# Patient Record
Sex: MALE | Race: WHITE | NOT HISPANIC OR LATINO | Employment: OTHER | RURAL
[De-identification: names, ages, dates, MRNs, and addresses within clinical notes are randomized per-mention and may not be internally consistent; named-entity substitution may affect disease eponyms.]

---

## 2020-05-19 ENCOUNTER — OFFICE VISIT CONVERTED (OUTPATIENT)
Dept: CARDIOLOGY | Facility: CLINIC | Age: 80
End: 2020-05-19
Attending: INTERNAL MEDICINE

## 2021-05-10 NOTE — H&P
History and Physical      Patient Name: Barbara Laird   Patient ID: [id number]   Sex: Male   YOB: 1940    Primary Care Provider: No PCP No PCP Other    Visit Date: May 19, 2020    Provider: Cedric Herzog MD   Location: [facility]   Location Address: [address] [place] [address]  [id number]          History Of Present Illness  Consult requested by: Lillian Leon   I saw Mr. Laird in the office today. This is an 79-year-old, white male. He has no previous cardiac history. Twice over the past month he has had an episode of palpitations moderately intense feeling like his heart is pounding. It has occurred both times, by his report, after he took his wife's nebulizer treatment. It resolved. He denies chest pain. He has mild shortness of breath with exertion. He stays relatively active. The symptoms had no clear exacerbating or alleviating factors, and there were no associated symptoms. Symptoms lasted approximately 1 hour.   PAST MEDICAL HISTORY: includes overweight, prostate problems, hypertension, dyslipidemia, diabetes.   PSYCHOSOCIAL HISTORY: Former smoker. No alcohol use. Rare caffeine intake. He is .   FAMILY HISTORY: Negative for premature coronary artery disease or sudden cardiac death.   CURRENT MEDICATIONS: include Lisinopril 10 mg a day; Flomax 0.4 mg a day; Crestor; Lantus 30 units qhs; Humulin. The dosage and frequency of the medications were reviewed with the patient.   ALLERGIES: No known drug allergies.      PAST SURGICAL HISTORY:  He has had back surgery in 1986 and 1993.       Review of Systems  · Constitutional  o Admits  o : good general health lately  o Denies  o : fatigue, recent weight changes   · Eyes  o Denies  o : double vision  · HENT  o Denies  o : hearing loss or ringing, chronic sinus problem, swollen glands in neck  · Cardiovascular  o Admits  o : palpitations (fast, fluttering, or skipping beats), shortness of breath while walking  "or lying flat  o Denies  o : chest pain, swelling (feet, ankles, hands)  · Respiratory  o Admits  o : a cough  o Denies  o : asthma or wheezing, COPD  · Gastrointestinal  o Denies  o : ulcers, nausea or vomiting  · Neurologic  o Denies  o : lightheaded or dizzy, stroke, headaches  · Musculoskeletal  o Denies  o : joint pain, back pain  · Endocrine  o Admits  o : diabetes  o Denies  o : thyroid disease, heat or cold intolerance, excessive thirst or urination  · Heme-Lymph  o Denies  o : bleeding or bruising tendency, anemia      Vitals  Date Time BP Position Site L\R Cuff Size HR RR TEMP (F) WT  HT  BMI kg/m2 BSA m2 O2 Sat HC       05/19/2020 03:38 /86 Sitting    66 - R   233lbs 16oz 5'  10\" 33.58 2.29     05/19/2020 03:38 /79 Sitting                     Physical Examination  · Constitutional  o Appearance  o : Obese, elderly, white male, pleasant, in no acute distress.  · Head and Face  o HEENT  o : No pallor, anicteric. Eyes normal. Moist mucous membranes.  · Neck  o Inspection/Palpation  o : Supple. No hepatosplenomegaly.  o Jugular Veins  o : No JVD. No carotid bruits.  · Respiratory  o Auscultation of Lungs  o : Clear to auscultation bilaterally. No crackles or wheezing.  · Cardiovascular  o Heart  o : S1, S2 is normally heard. No S3. No murmur, rubs, or gallops.  · Gastrointestinal  o Abdominal Examination  o : Soft, non-distended. No palpable hepatosplenomegaly. Bowel sounds heard in all four quadrants.  · Musculoskeletal  o General  o : Normal muscle tone and strength.  · Skin and Subcutaneous Tissue  o General Inspection  o : No skin rashes.  · Extremities  o Extremities  o : Warm and well perfused. Distal pulses present. No pitting pedal edema.     I personally reviewed his EKG tracing from April 10th.  This showed sinus rhythm, borderline low voltage, normal intervals, no ST changes.  No previous tracing for comparison.    Holter Monitor was reviewed from April.  This showed heart rate 48 - " 122, average 72.  There were mildly frequent supraventricular ectopic beats with brief atrial runs.  There were no ventricular arrhythmias noted.    Laboratory studies reviewed.  A1c recently 12.5.  BUN and creatinine normal.  TSH normal.  Lipids controlled.           Assessment     1.  Episodic palpitations - Both times these seemed to have occurred after the patient used his wife's nebulizer       treatment.  This likely precipitated a dysrhythmia.  His baseline EKG is unremarkable from April 10th.  A        Holter Monitor showed mildly frequent PACs with short atrial runs, but no high-risk arrhythmias.  He has        multiple cardiac risk factors.  2.  Hypertension - mildly elevated.  3.  Dyslipidemia - controlled.  4.  Diabetes - uncontrolled.  5.  Obesity.       Plan     1.  The patient will be scheduled for an echocardiogram and stress-imaging study to evaluate for structural or        ischemic heart disease.  2.  I discussed with him he needs to try to lose weight, reduce his carbohydrate intake.  3.  If he does not have any evidence of significant structural or ischemic heart disease, more than likely his       perceived dysrhythmia was precipitated by nebulizer use.  I have instructed him not to use his wife's        nebulizer treatment any longer.  I will call the patient with test results when available; otherwise, he will be       followed as needed.    Thank you for allowing me to participate in his care.    Sincerely,        MELLY Herzog M.D.  sachi/padmaja           This note was transcribed by Kenia Poon.  dmd/cbd  The above service was transcribed by Kenia Poon, and I attest to the accuracy of the note.  CBD.             Electronically Signed by: Kenia Poon-, -Author on May 27, 2020 08:11:52 AM  Electronically Co-signed by: Cedric Herzog MD -Reviewer on May 27, 2020 08:44:13 AM

## 2021-05-15 VITALS
HEIGHT: 70 IN | SYSTOLIC BLOOD PRESSURE: 158 MMHG | DIASTOLIC BLOOD PRESSURE: 86 MMHG | HEART RATE: 66 BPM | BODY MASS INDEX: 33.5 KG/M2 | WEIGHT: 234 LBS

## 2022-04-26 ENCOUNTER — OFFICE VISIT (OUTPATIENT)
Dept: CARDIOLOGY | Facility: CLINIC | Age: 82
End: 2022-04-26

## 2022-04-26 ENCOUNTER — TELEPHONE (OUTPATIENT)
Dept: CARDIOLOGY | Facility: CLINIC | Age: 82
End: 2022-04-26

## 2022-04-26 VITALS
WEIGHT: 216 LBS | HEIGHT: 69 IN | BODY MASS INDEX: 31.99 KG/M2 | SYSTOLIC BLOOD PRESSURE: 136 MMHG | DIASTOLIC BLOOD PRESSURE: 75 MMHG | HEART RATE: 76 BPM

## 2022-04-26 DIAGNOSIS — I10 HYPERTENSION, ESSENTIAL: ICD-10-CM

## 2022-04-26 DIAGNOSIS — I48.0 PAROXYSMAL ATRIAL FIBRILLATION: Primary | ICD-10-CM

## 2022-04-26 DIAGNOSIS — E78.2 HYPERLIPEMIA, MIXED: ICD-10-CM

## 2022-04-26 DIAGNOSIS — Z79.899 LONG TERM CURRENT USE OF AMIODARONE: ICD-10-CM

## 2022-04-26 PROCEDURE — 99214 OFFICE O/P EST MOD 30 MIN: CPT | Performed by: INTERNAL MEDICINE

## 2022-04-26 RX ORDER — ALBUTEROL SULFATE 90 UG/1
AEROSOL, METERED RESPIRATORY (INHALATION)
COMMUNITY

## 2022-04-26 RX ORDER — HYDROCODONE BITARTRATE AND ACETAMINOPHEN 5; 325 MG/1; MG/1
TABLET ORAL EVERY 6 HOURS PRN
COMMUNITY

## 2022-04-26 RX ORDER — ETHAMBUTOL HYDROCHLORIDE 400 MG/1
TABLET, FILM COATED ORAL
COMMUNITY

## 2022-04-26 RX ORDER — AMIODARONE HYDROCHLORIDE 200 MG/1
TABLET ORAL 2 TIMES DAILY
COMMUNITY
End: 2022-04-26 | Stop reason: SDUPTHER

## 2022-04-26 RX ORDER — APIXABAN 5 MG/1
5 TABLET, FILM COATED ORAL 2 TIMES DAILY
COMMUNITY
Start: 2022-04-05

## 2022-04-26 RX ORDER — LORATADINE 10 MG/1
TABLET ORAL DAILY
COMMUNITY

## 2022-04-26 RX ORDER — HUMAN INSULIN 100 [IU]/ML
INJECTION, SUSPENSION SUBCUTANEOUS
COMMUNITY
Start: 2022-02-22

## 2022-04-26 RX ORDER — ROSUVASTATIN CALCIUM 5 MG/1
TABLET, COATED ORAL DAILY
COMMUNITY

## 2022-04-26 RX ORDER — AMIODARONE HYDROCHLORIDE 200 MG/1
200 TABLET ORAL DAILY
Qty: 90 TABLET | Refills: 3 | Status: SHIPPED | OUTPATIENT
Start: 2022-04-26

## 2022-04-26 RX ORDER — TAMSULOSIN HYDROCHLORIDE 0.4 MG/1
CAPSULE ORAL DAILY
COMMUNITY

## 2022-04-26 RX ORDER — AZITHROMYCIN 500 MG/1
TABLET, FILM COATED ORAL
COMMUNITY

## 2022-04-26 RX ORDER — FUROSEMIDE 20 MG/1
TABLET ORAL DAILY
COMMUNITY

## 2022-04-26 RX ORDER — RIFAMPIN 300 MG/1
CAPSULE ORAL DAILY
COMMUNITY

## 2022-04-26 RX ORDER — CLOTRIMAZOLE AND BETAMETHASONE DIPROPIONATE 10; .64 MG/G; MG/G
CREAM TOPICAL
COMMUNITY

## 2022-04-26 NOTE — TELEPHONE ENCOUNTER
CALLED [facility], SPOKE TO ARIC. PTS DX: MAC. DR RED WANTED TO FIND A DIFFERENT ANTIBIOTIC FOR THE PATIENT RATHER THAN AZITHTROMYCIN. IT INTERACTS WITH HIS AMIODARONE AND DR RED DOESN'T WANT THE PATIENT TO TAKE THAT. SHE MADE A NOTE AND STATED SHE WOULD CALL BACK.

## 2022-04-26 NOTE — PROGRESS NOTES
Chief Complaint  Palpitations    Subjective            Barbara Laird presents to [facility] CARDIOLOGY  History of Present Illness    Barbara is here for follow-up evaluation management of paroxysmal atrial fibrillation, amiodarone use, hypertension, mixed hyperlipidemia.  Since last visit with me he was hospitalized in March with chest pain, new onset rapid atrial fibrillation.  He converted back to sinus rhythm after amiodarone was started.  Additionally he was started on anticoagulation.  Since then he has been diagnosed with MAC infection, and he also is on ongoing chemotherapy for lung cancer.  He has scant hemoptysis but this has been stable.  He had 1 additional episode of scapular back pain and went to the emergency room in April but was in sinus rhythm at that point.    PMH  Past Medical History:   Diagnosis Date   • Arrhythmia    • Diabetes mellitus (HCC)    • Hyperlipidemia    • Hypertension          SURGICALHX  History reviewed. No pertinent surgical history.     SOC  Social History     Socioeconomic History   • Marital status:    Tobacco Use   • Smoking status: Former Smoker   • Smokeless tobacco: Never Used   Vaping Use   • Vaping Use: Never used   Substance and Sexual Activity   • Alcohol use: Not Currently   • Drug use: Never   • Sexual activity: Defer         FAMHX  Family History   Problem Relation Age of Onset   • Stroke Mother    • No Known Problems Father           ALLERGY  No Known Allergies     MEDSCURRENT    Current Outpatient Medications:   •  albuterol sulfate  (90 Base) MCG/ACT inhaler, Ventolin HFA 90 mcg/actuation aerosol inhaler  inhale TWO puffs BY MOUTH EVERY 4 HOURS AS NEEDED, Disp: , Rfl:   •  amiodarone (PACERONE) 200 MG tablet, Take 1 tablet by mouth Daily., Disp: 90 tablet, Rfl: 3  •  azithromycin (ZITHROMAX) 500 MG tablet, M,W,F only, Disp: , Rfl:   •  clotrimazole-betamethasone (LOTRISONE) 1-0.05 % cream, clotrimazole-betamethasone 1 %-0.05 %  "topical cream  APPLY TO THE AFFECTED AREA(S) TWICE DAILY, Disp: , Rfl:   •  Diclofenac Sodium (VOLTAREN) 1 % gel gel, diclofenac 1 % topical gel  APPLY TO THE AFFECTED AREA(S) TWO grams TWICE DAILY AS DIRECTED, Disp: , Rfl:   •  Eliquis 5 MG tablet tablet, Take 5 mg by mouth 2 (Two) Times a Day., Disp: , Rfl:   •  ethambutol (MYAMBUTOL) 400 MG tablet, M, W, F only, Disp: , Rfl:   •  furosemide (LASIX) 20 MG tablet, Daily., Disp: , Rfl:   •  HYDROcodone-acetaminophen (NORCO) 5-325 MG per tablet, Every 6 (Six) Hours As Needed., Disp: , Rfl:   •  loratadine (CLARITIN) 10 MG tablet, Daily., Disp: , Rfl:   •  NovoLIN 70/30 FlexPen (70-30) 100 UNIT/ML suspension pen-injector, INJECT 18 UNITS BEFORE BREAKFAST AND 18 UNITS BEFORE SUPPER, Disp: , Rfl:   •  rifAMPin (RIFADIN) 300 MG capsule, Daily., Disp: , Rfl:   •  rosuvastatin (CRESTOR) 5 MG tablet, Daily., Disp: , Rfl:   •  tamsulosin (FLOMAX) 0.4 MG capsule 24 hr capsule, Daily., Disp: , Rfl:       Review of Systems   Cardiovascular: Positive for dyspnea on exertion. Negative for chest pain and irregular heartbeat.   Respiratory: Positive for hemoptysis and shortness of breath.         Objective     /75   Pulse 76   Ht 175.3 cm (69\")   Wt 98 kg (216 lb)   BMI 31.90 kg/m²       General Appearance:   · well developed  · well nourished  HENT:   · oropharynx moist  · lips not cyanotic  Neck:  · thyroid not enlarged  · supple  Respiratory:  · no respiratory distress  · normal breath sounds  · no rales  Cardiovascular:  · no jugular venous distention  · regular rhythm  · apical impulse normal  · S1 normal, S2 normal  · no S3, no S4   · no murmur  · no rub, no thrill  · carotid pulses normal; no bruit  · pedal pulses normal  · lower extremity edema: none    Musculoskeletal:  · no clubbing of fingers.   · normocephalic, head atraumatic  Skin:   · warm, dry  Psychiatric:  · judgement and insight appropriate  · normal mood and affect      Result Review :             Data " reviewed: Primary care records reviewed, cardiology consultation reviewed from March in the hospital, recent EKG personally reviewed by me April 22, sinus rhythm, no acute ST changes,      Procedures             Assessment and Plan        ASSESSMENT:  Encounter Diagnoses   Name Primary?   • Paroxysmal atrial fibrillation (HCC) Yes   • Hypertension, essential    • Hyperlipemia, mixed    • Long term current use of amiodarone          PLAN:    1.  Paroxysmal atrial fibrillation, currently maintaining sinus rhythm.  Reduce amiodarone to once daily dosing.  He is being treated for MAC infection with triple therapy including azithromycin.  Given the potential QT interaction with amiodarone I am going to make contact with the health department to try to find an alternative medication that would not interact significantly with amiodarone.  Continue anticoagulation, he has very scant hemoptysis by description, if this becomes more significant we may need to stop anticoagulation but currently the risk/benefit would favor continuing it.  2.  Hypertension controlled, continue current medical therapy.  3.  Mixed hyperlipidemia, stable continue statin therapy    Follow-up in about 3 months      Patient was given instructions and counseling regarding his condition or for health maintenance advice. Please see specific information pulled into the AVS if appropriate.             VINCE Herzog MD  4/26/2022    12:58 EDT

## 2022-04-27 DIAGNOSIS — I48.0 PAF (PAROXYSMAL ATRIAL FIBRILLATION): Primary | ICD-10-CM

## 2022-04-27 NOTE — TELEPHONE ENCOUNTER
DR TRUJILLO HIMSELF SPOKE TO DR RED, DR RED ADVISED ROLF SANDERSON TO DO AN EKG THE NEXT 2 MONDAYS TO CHECK QT DUE TO THERE NOT BEING AN ALTERNATIVE TO THE ANTIBIOTIC PT IS TAKING.

## 2022-04-28 ENCOUNTER — DOCUMENTATION (OUTPATIENT)
Dept: CARDIOLOGY | Facility: CLINIC | Age: 82
End: 2022-04-28

## 2022-04-28 RX ORDER — CARVEDILOL 12.5 MG/1
12.5 TABLET ORAL 2 TIMES DAILY WITH MEALS
COMMUNITY